# Patient Record
Sex: MALE | Race: WHITE | NOT HISPANIC OR LATINO
[De-identification: names, ages, dates, MRNs, and addresses within clinical notes are randomized per-mention and may not be internally consistent; named-entity substitution may affect disease eponyms.]

---

## 2017-01-26 ENCOUNTER — APPOINTMENT (OUTPATIENT)
Dept: UROLOGY | Facility: CLINIC | Age: 42
End: 2017-01-26

## 2017-01-26 DIAGNOSIS — R79.89 OTHER SPECIFIED ABNORMAL FINDINGS OF BLOOD CHEMISTRY: ICD-10-CM

## 2017-01-26 DIAGNOSIS — R35.0 FREQUENCY OF MICTURITION: ICD-10-CM

## 2017-03-20 ENCOUNTER — FORM ENCOUNTER (OUTPATIENT)
Age: 42
End: 2017-03-20

## 2017-03-21 ENCOUNTER — OUTPATIENT (OUTPATIENT)
Dept: OUTPATIENT SERVICES | Facility: HOSPITAL | Age: 42
LOS: 1 days | End: 2017-03-21
Payer: COMMERCIAL

## 2017-03-21 ENCOUNTER — APPOINTMENT (OUTPATIENT)
Dept: UROLOGY | Facility: CLINIC | Age: 42
End: 2017-03-21

## 2017-03-21 ENCOUNTER — APPOINTMENT (OUTPATIENT)
Dept: CT IMAGING | Facility: CLINIC | Age: 42
End: 2017-03-21

## 2017-03-21 DIAGNOSIS — N20.0 CALCULUS OF KIDNEY: ICD-10-CM

## 2017-03-21 DIAGNOSIS — Z00.8 ENCOUNTER FOR OTHER GENERAL EXAMINATION: ICD-10-CM

## 2017-03-21 DIAGNOSIS — Z98.89 OTHER SPECIFIED POSTPROCEDURAL STATES: Chronic | ICD-10-CM

## 2017-03-21 DIAGNOSIS — R10.31 RIGHT LOWER QUADRANT PAIN: ICD-10-CM

## 2017-03-21 LAB
BILIRUB UR QL STRIP: NEGATIVE
CLARITY UR: CLEAR
COLLECTION METHOD: NORMAL
GLUCOSE UR-MCNC: NEGATIVE
HCG UR QL: 0.2 EU/DL
HGB UR QL STRIP.AUTO: NEGATIVE
KETONES UR-MCNC: NEGATIVE
LEUKOCYTE ESTERASE UR QL STRIP: NEGATIVE
NITRITE UR QL STRIP: NEGATIVE
PH UR STRIP: 5.5
PROT UR STRIP-MCNC: NEGATIVE
SP GR UR STRIP: 1

## 2017-03-21 PROCEDURE — 74176 CT ABD & PELVIS W/O CONTRAST: CPT

## 2017-06-05 ENCOUNTER — RECORD ABSTRACTING (OUTPATIENT)
Age: 42
End: 2017-06-05

## 2017-06-08 ENCOUNTER — APPOINTMENT (OUTPATIENT)
Dept: INTERNAL MEDICINE | Facility: CLINIC | Age: 42
End: 2017-06-08

## 2017-06-08 VITALS
SYSTOLIC BLOOD PRESSURE: 140 MMHG | HEIGHT: 69 IN | BODY MASS INDEX: 35.1 KG/M2 | TEMPERATURE: 98.1 F | OXYGEN SATURATION: 97 % | WEIGHT: 237 LBS | RESPIRATION RATE: 18 BRPM | HEART RATE: 82 BPM | DIASTOLIC BLOOD PRESSURE: 82 MMHG

## 2017-06-08 RX ORDER — ALBUTEROL SULFATE 90 UG/1
108 (90 BASE) AEROSOL, METERED RESPIRATORY (INHALATION)
Qty: 1 | Refills: 5 | Status: ACTIVE | COMMUNITY
Start: 2017-06-08 | End: 1900-01-01

## 2017-07-03 ENCOUNTER — APPOINTMENT (OUTPATIENT)
Dept: DERMATOLOGY | Facility: CLINIC | Age: 42
End: 2017-07-03

## 2017-07-28 ENCOUNTER — APPOINTMENT (OUTPATIENT)
Dept: DERMATOLOGY | Facility: CLINIC | Age: 42
End: 2017-07-28
Payer: MEDICAID

## 2017-07-28 VITALS — WEIGHT: 240 LBS | HEIGHT: 69 IN | BODY MASS INDEX: 35.55 KG/M2

## 2017-07-28 DIAGNOSIS — Z85.828 PERSONAL HISTORY OF OTHER MALIGNANT NEOPLASM OF SKIN: ICD-10-CM

## 2017-07-28 DIAGNOSIS — Z80.8 FAMILY HISTORY OF MALIGNANT NEOPLASM OF OTHER ORGANS OR SYSTEMS: ICD-10-CM

## 2017-07-28 DIAGNOSIS — L40.9 PSORIASIS, UNSPECIFIED: ICD-10-CM

## 2017-07-28 PROCEDURE — 99203 OFFICE O/P NEW LOW 30 MIN: CPT

## 2017-07-28 RX ORDER — CLOTRIMAZOLE AND BETAMETHASONE DIPROPIONATE 10; .5 MG/G; MG/G
1-0.05 CREAM TOPICAL
Qty: 30 | Refills: 0 | Status: COMPLETED | COMMUNITY
Start: 2017-02-13

## 2017-07-28 RX ORDER — FLUTICASONE PROPIONATE 50 UG/1
50 SPRAY, METERED NASAL
Qty: 16 | Refills: 0 | Status: COMPLETED | COMMUNITY
Start: 2017-06-23

## 2017-07-28 RX ORDER — CLOBETASOL PROPIONATE 0.5 MG/ML
0.05 SOLUTION TOPICAL
Qty: 1 | Refills: 2 | Status: DISCONTINUED | COMMUNITY
Start: 2017-07-28 | End: 2017-07-28

## 2017-07-28 RX ORDER — FLUOCINONIDE 0.5 MG/G
0.05 CREAM TOPICAL
Qty: 30 | Refills: 0 | Status: COMPLETED | COMMUNITY
Start: 2017-02-13

## 2017-07-28 RX ORDER — CLOBETASOL PROPIONATE 0.5 MG/G
0.05 CREAM TOPICAL
Qty: 60 | Refills: 0 | Status: ACTIVE | COMMUNITY
Start: 2017-06-23

## 2017-07-28 RX ORDER — AZELASTINE HYDROCHLORIDE 137 UG/1
0.1 SPRAY, METERED NASAL
Qty: 30 | Refills: 0 | Status: ACTIVE | COMMUNITY
Start: 2017-06-23

## 2017-07-28 RX ORDER — CLOBETASOL PROPIONATE 0.5 MG/G
0.05 OINTMENT TOPICAL
Qty: 1 | Refills: 2 | Status: DISCONTINUED | COMMUNITY
Start: 2017-07-28 | End: 2017-07-28

## 2017-07-28 RX ORDER — NYSTATIN 100000 [USP'U]/ML
100000 SUSPENSION ORAL
Qty: 200 | Refills: 0 | Status: COMPLETED | COMMUNITY
Start: 2017-06-23

## 2017-07-28 RX ORDER — VALSARTAN 320 MG/1
320 TABLET, COATED ORAL
Qty: 30 | Refills: 0 | Status: COMPLETED | COMMUNITY
Start: 2017-01-23

## 2017-07-28 RX ORDER — PANTOPRAZOLE 40 MG/1
40 TABLET, DELAYED RELEASE ORAL
Qty: 30 | Refills: 0 | Status: DISCONTINUED | COMMUNITY
Start: 2017-06-23

## 2017-07-28 RX ORDER — BETAMETHASONE DIPROPIONATE 0.5 MG/ML
0.05 LOTION, AUGMENTED TOPICAL
Qty: 60 | Refills: 3 | Status: ACTIVE | COMMUNITY
Start: 2017-07-28 | End: 1900-01-01

## 2017-07-28 RX ORDER — CEFUROXIME AXETIL 250 MG/1
250 TABLET ORAL
Qty: 14 | Refills: 0 | Status: COMPLETED | COMMUNITY
Start: 2017-06-23

## 2017-07-28 RX ORDER — CIPROFLOXACIN HYDROCHLORIDE 250 MG/1
250 TABLET, FILM COATED ORAL
Qty: 14 | Refills: 0 | Status: COMPLETED | COMMUNITY
Start: 2017-04-25

## 2017-07-28 RX ORDER — AZITHROMYCIN 250 MG/1
250 TABLET, FILM COATED ORAL
Qty: 6 | Refills: 0 | Status: DISCONTINUED | COMMUNITY
Start: 2017-02-13

## 2017-07-28 RX ORDER — GUAIFENESIN AND CODEINE PHOSPHATE 100; 10 MG/5ML; MG/5ML
100-10 SYRUP ORAL
Qty: 240 | Refills: 0 | Status: COMPLETED | COMMUNITY
Start: 2017-05-17

## 2017-07-28 RX ORDER — BETAMETHASONE DIPROPIONATE 0.5 MG/G
0.05 OINTMENT, AUGMENTED TOPICAL
Qty: 1 | Refills: 2 | Status: ACTIVE | COMMUNITY
Start: 2017-07-28 | End: 1900-01-01

## 2017-08-28 ENCOUNTER — APPOINTMENT (OUTPATIENT)
Dept: DERMATOLOGY | Facility: CLINIC | Age: 42
End: 2017-08-28

## 2017-09-11 ENCOUNTER — APPOINTMENT (OUTPATIENT)
Dept: INTERNAL MEDICINE | Facility: CLINIC | Age: 42
End: 2017-09-11

## 2017-10-12 ENCOUNTER — APPOINTMENT (OUTPATIENT)
Dept: SLEEP CENTER | Facility: CLINIC | Age: 42
End: 2017-10-12

## 2017-12-11 ENCOUNTER — APPOINTMENT (OUTPATIENT)
Dept: SLEEP CENTER | Facility: CLINIC | Age: 42
End: 2017-12-11

## 2018-01-28 ENCOUNTER — TRANSCRIPTION ENCOUNTER (OUTPATIENT)
Age: 43
End: 2018-01-28

## 2018-02-20 ENCOUNTER — APPOINTMENT (OUTPATIENT)
Dept: SLEEP CENTER | Facility: CLINIC | Age: 43
End: 2018-02-20
Payer: MEDICAID

## 2018-02-20 ENCOUNTER — OUTPATIENT (OUTPATIENT)
Dept: OUTPATIENT SERVICES | Facility: HOSPITAL | Age: 43
LOS: 1 days | End: 2018-02-20
Payer: MEDICAID

## 2018-02-20 DIAGNOSIS — Z98.89 OTHER SPECIFIED POSTPROCEDURAL STATES: Chronic | ICD-10-CM

## 2018-02-20 PROCEDURE — G0399: CPT

## 2018-02-20 PROCEDURE — G0399: CPT | Mod: 26

## 2018-02-22 ENCOUNTER — RESULT REVIEW (OUTPATIENT)
Age: 43
End: 2018-02-22

## 2018-02-22 DIAGNOSIS — G47.33 OBSTRUCTIVE SLEEP APNEA (ADULT) (PEDIATRIC): ICD-10-CM

## 2018-02-26 ENCOUNTER — NON-APPOINTMENT (OUTPATIENT)
Age: 43
End: 2018-02-26

## 2018-02-26 ENCOUNTER — APPOINTMENT (OUTPATIENT)
Dept: INTERNAL MEDICINE | Facility: CLINIC | Age: 43
End: 2018-02-26
Payer: MEDICAID

## 2018-02-26 VITALS
WEIGHT: 236 LBS | HEIGHT: 69 IN | RESPIRATION RATE: 16 BRPM | HEART RATE: 96 BPM | OXYGEN SATURATION: 96 % | DIASTOLIC BLOOD PRESSURE: 84 MMHG | SYSTOLIC BLOOD PRESSURE: 122 MMHG | BODY MASS INDEX: 34.96 KG/M2 | TEMPERATURE: 98.2 F

## 2018-02-26 DIAGNOSIS — M54.5 LOW BACK PAIN: ICD-10-CM

## 2018-02-26 PROCEDURE — 94060 EVALUATION OF WHEEZING: CPT

## 2018-02-26 PROCEDURE — 99214 OFFICE O/P EST MOD 30 MIN: CPT | Mod: 25

## 2018-03-24 ENCOUNTER — EMERGENCY (EMERGENCY)
Facility: HOSPITAL | Age: 43
LOS: 0 days | Discharge: HOME | End: 2018-03-24
Attending: EMERGENCY MEDICINE | Admitting: EMERGENCY MEDICINE
Payer: MEDICAID

## 2018-03-24 VITALS
HEART RATE: 83 BPM | RESPIRATION RATE: 17 BRPM | DIASTOLIC BLOOD PRESSURE: 88 MMHG | OXYGEN SATURATION: 100 % | TEMPERATURE: 98 F | SYSTOLIC BLOOD PRESSURE: 126 MMHG

## 2018-03-24 VITALS — HEIGHT: 69 IN | WEIGHT: 235.01 LBS

## 2018-03-24 DIAGNOSIS — R10.32 LEFT LOWER QUADRANT PAIN: ICD-10-CM

## 2018-03-24 DIAGNOSIS — N50.89 OTHER SPECIFIED DISORDERS OF THE MALE GENITAL ORGANS: ICD-10-CM

## 2018-03-24 DIAGNOSIS — Z98.89 OTHER SPECIFIED POSTPROCEDURAL STATES: Chronic | ICD-10-CM

## 2018-03-24 LAB
ALBUMIN SERPL ELPH-MCNC: 3.7 G/DL — SIGNIFICANT CHANGE UP (ref 3.3–5)
ALP SERPL-CCNC: 86 U/L — SIGNIFICANT CHANGE UP (ref 40–120)
ALT FLD-CCNC: 42 U/L — SIGNIFICANT CHANGE UP (ref 12–78)
ANION GAP SERPL CALC-SCNC: 10 MMOL/L — SIGNIFICANT CHANGE UP (ref 5–17)
APPEARANCE UR: CLEAR — SIGNIFICANT CHANGE UP
APTT BLD: 32.5 SEC — SIGNIFICANT CHANGE UP (ref 27.5–37.4)
AST SERPL-CCNC: 23 U/L — SIGNIFICANT CHANGE UP (ref 15–37)
BASOPHILS # BLD AUTO: 0.06 K/UL — SIGNIFICANT CHANGE UP (ref 0–0.2)
BASOPHILS NFR BLD AUTO: 1.2 % — SIGNIFICANT CHANGE UP (ref 0–2)
BILIRUB SERPL-MCNC: 0.5 MG/DL — SIGNIFICANT CHANGE UP (ref 0.2–1.2)
BILIRUB UR-MCNC: NEGATIVE — SIGNIFICANT CHANGE UP
BUN SERPL-MCNC: 16 MG/DL — SIGNIFICANT CHANGE UP (ref 7–23)
CALCIUM SERPL-MCNC: 8.7 MG/DL — SIGNIFICANT CHANGE UP (ref 8.5–10.1)
CHLORIDE SERPL-SCNC: 108 MMOL/L — SIGNIFICANT CHANGE UP (ref 96–108)
CO2 SERPL-SCNC: 24 MMOL/L — SIGNIFICANT CHANGE UP (ref 22–31)
COLOR SPEC: YELLOW — SIGNIFICANT CHANGE UP
CREAT SERPL-MCNC: 1.04 MG/DL — SIGNIFICANT CHANGE UP (ref 0.5–1.3)
D DIMER BLD IA.RAPID-MCNC: 186 NG/ML DDU — SIGNIFICANT CHANGE UP
DIFF PNL FLD: NEGATIVE — SIGNIFICANT CHANGE UP
EOSINOPHIL # BLD AUTO: 0.19 K/UL — SIGNIFICANT CHANGE UP (ref 0–0.5)
EOSINOPHIL NFR BLD AUTO: 3.7 % — SIGNIFICANT CHANGE UP (ref 0–6)
GLUCOSE SERPL-MCNC: 93 MG/DL — SIGNIFICANT CHANGE UP (ref 70–99)
GLUCOSE UR QL: NEGATIVE MG/DL — SIGNIFICANT CHANGE UP
HCT VFR BLD CALC: 44.7 % — SIGNIFICANT CHANGE UP (ref 39–50)
HGB BLD-MCNC: 14.7 G/DL — SIGNIFICANT CHANGE UP (ref 13–17)
IMM GRANULOCYTES NFR BLD AUTO: 0.2 % — SIGNIFICANT CHANGE UP (ref 0–1.5)
INR BLD: 0.99 RATIO — SIGNIFICANT CHANGE UP (ref 0.88–1.16)
KETONES UR-MCNC: NEGATIVE — SIGNIFICANT CHANGE UP
LEUKOCYTE ESTERASE UR-ACNC: NEGATIVE — SIGNIFICANT CHANGE UP
LYMPHOCYTES # BLD AUTO: 1.11 K/UL — SIGNIFICANT CHANGE UP (ref 1–3.3)
LYMPHOCYTES # BLD AUTO: 21.8 % — SIGNIFICANT CHANGE UP (ref 13–44)
MCHC RBC-ENTMCNC: 28.9 PG — SIGNIFICANT CHANGE UP (ref 27–34)
MCHC RBC-ENTMCNC: 32.9 GM/DL — SIGNIFICANT CHANGE UP (ref 32–36)
MCV RBC AUTO: 87.8 FL — SIGNIFICANT CHANGE UP (ref 80–100)
MONOCYTES # BLD AUTO: 0.54 K/UL — SIGNIFICANT CHANGE UP (ref 0–0.9)
MONOCYTES NFR BLD AUTO: 10.6 % — SIGNIFICANT CHANGE UP (ref 2–14)
NEUTROPHILS # BLD AUTO: 3.19 K/UL — SIGNIFICANT CHANGE UP (ref 1.8–7.4)
NEUTROPHILS NFR BLD AUTO: 62.5 % — SIGNIFICANT CHANGE UP (ref 43–77)
NITRITE UR-MCNC: NEGATIVE — SIGNIFICANT CHANGE UP
NRBC # BLD: 0 /100 WBCS — SIGNIFICANT CHANGE UP (ref 0–0)
PH UR: 6.5 — SIGNIFICANT CHANGE UP (ref 5–8)
PLATELET # BLD AUTO: 233 K/UL — SIGNIFICANT CHANGE UP (ref 150–400)
POTASSIUM SERPL-MCNC: 4.2 MMOL/L — SIGNIFICANT CHANGE UP (ref 3.5–5.3)
POTASSIUM SERPL-SCNC: 4.2 MMOL/L — SIGNIFICANT CHANGE UP (ref 3.5–5.3)
PROT SERPL-MCNC: 7.4 GM/DL — SIGNIFICANT CHANGE UP (ref 6–8.3)
PROT UR-MCNC: NEGATIVE MG/DL — SIGNIFICANT CHANGE UP
PROTHROM AB SERPL-ACNC: 10.7 SEC — SIGNIFICANT CHANGE UP (ref 9.8–12.7)
RBC # BLD: 5.09 M/UL — SIGNIFICANT CHANGE UP (ref 4.2–5.8)
RBC # FLD: 13.2 % — SIGNIFICANT CHANGE UP (ref 10.3–14.5)
SODIUM SERPL-SCNC: 142 MMOL/L — SIGNIFICANT CHANGE UP (ref 135–145)
SP GR SPEC: 1.01 — SIGNIFICANT CHANGE UP (ref 1.01–1.02)
TROPONIN I SERPL-MCNC: <0.015 NG/ML — SIGNIFICANT CHANGE UP (ref 0.01–0.04)
UROBILINOGEN FLD QL: NEGATIVE MG/DL — SIGNIFICANT CHANGE UP
WBC # BLD: 5.1 K/UL — SIGNIFICANT CHANGE UP (ref 3.8–10.5)
WBC # FLD AUTO: 5.1 K/UL — SIGNIFICANT CHANGE UP (ref 3.8–10.5)

## 2018-03-24 PROCEDURE — 93010 ELECTROCARDIOGRAM REPORT: CPT

## 2018-03-24 PROCEDURE — 76870 US EXAM SCROTUM: CPT | Mod: 26

## 2018-03-24 PROCEDURE — 74176 CT ABD & PELVIS W/O CONTRAST: CPT | Mod: 26

## 2018-03-24 PROCEDURE — 99284 EMERGENCY DEPT VISIT MOD MDM: CPT

## 2018-03-24 PROCEDURE — 71046 X-RAY EXAM CHEST 2 VIEWS: CPT | Mod: 26

## 2018-03-24 RX ORDER — MORPHINE SULFATE 50 MG/1
4 CAPSULE, EXTENDED RELEASE ORAL ONCE
Qty: 0 | Refills: 0 | Status: DISCONTINUED | OUTPATIENT
Start: 2018-03-24 | End: 2018-03-24

## 2018-03-24 RX ORDER — KETOROLAC TROMETHAMINE 30 MG/ML
30 SYRINGE (ML) INJECTION ONCE
Qty: 0 | Refills: 0 | Status: DISCONTINUED | OUTPATIENT
Start: 2018-03-24 | End: 2018-03-24

## 2018-03-24 RX ORDER — SODIUM CHLORIDE 9 MG/ML
1000 INJECTION INTRAMUSCULAR; INTRAVENOUS; SUBCUTANEOUS ONCE
Qty: 0 | Refills: 0 | Status: COMPLETED | OUTPATIENT
Start: 2018-03-24 | End: 2018-03-24

## 2018-03-24 RX ADMIN — MORPHINE SULFATE 4 MILLIGRAM(S): 50 CAPSULE, EXTENDED RELEASE ORAL at 20:49

## 2018-03-24 RX ADMIN — SODIUM CHLORIDE 1000 MILLILITER(S): 9 INJECTION INTRAMUSCULAR; INTRAVENOUS; SUBCUTANEOUS at 19:04

## 2018-03-24 RX ADMIN — Medication 30 MILLIGRAM(S): at 22:41

## 2018-03-24 RX ADMIN — Medication 30 MILLIGRAM(S): at 19:00

## 2018-03-24 RX ADMIN — MORPHINE SULFATE 4 MILLIGRAM(S): 50 CAPSULE, EXTENDED RELEASE ORAL at 21:10

## 2018-03-24 NOTE — ED ADULT TRIAGE NOTE - CHIEF COMPLAINT QUOTE
pt c/o abdominal pain radiating around to the back since yesterday. chest pain/tightness starting about 30min ago. Pt has h/o kidney stones and states it feels similar to that. denies hematuria.

## 2018-03-24 NOTE — ED STATDOCS - PHYSICAL EXAMINATION
GEN: AOX3, NAD. HEENT: Throat clear. Head NC/AT. NECK: Supple, No JVD. FROM. C-spine non-tender. CV:S1S2, RRR, LUNGS: CTA b/l, no w/r/r. ABD: Soft, NT/ND, no rebound, no guarding. No CVAT. EXT: No e/c/c. 2+ distal pulses. SKIN: No rashes. NEURO: No focal deficits. CN II-XII intact. FROM. 5/5 motor and sensory. : No penile discharge or rashes. Mild tenderness left testicle. SIERRA Pham

## 2018-03-24 NOTE — ED STATDOCS - PMH
Diverticulosis    GERD (gastroesophageal reflux disease)    Hiatal hernia    HLD (hyperlipidemia)    HTN (hypertension)    Peptic ulcer disease    Psoriasis

## 2018-03-24 NOTE — ED STATDOCS - PROGRESS NOTE DETAILS
patient reexamined by Dr. Mohamud. Patient is feeling much better, tests/labs reviewed. case discussed with attending. OK to dc home. SIERRA Pham

## 2018-03-24 NOTE — ED STATDOCS - OBJECTIVE STATEMENT
42 y/o M w/ pmhx of HTN, renal calculi, HLD, presents to ED c/o left testicular pain starting 3 days ago, abdominal pain and back pain. Pt states pain feels similar to that of previous kidney stone. Reports chest pain x1 hour ago. Denies fever, chills, hematuria, or any other acute complaints. Urologist Dr. Pinto. Currently on Losartan.

## 2018-03-26 ENCOUNTER — APPOINTMENT (OUTPATIENT)
Dept: UROLOGY | Facility: CLINIC | Age: 43
End: 2018-03-26
Payer: MEDICAID

## 2018-03-26 DIAGNOSIS — R35.1 NOCTURIA: ICD-10-CM

## 2018-03-26 LAB
APPEARANCE: CLEAR
BACTERIA UR CULT: NORMAL
BACTERIA: NEGATIVE
BILIRUBIN URINE: NEGATIVE
BLOOD URINE: NEGATIVE
COLOR: YELLOW
GLUCOSE QUALITATIVE U: NEGATIVE MG/DL
HYALINE CASTS: 0 /LPF
KETONES URINE: NEGATIVE
LEUKOCYTE ESTERASE URINE: NEGATIVE
MICROSCOPIC-UA: NORMAL
NITRITE URINE: NEGATIVE
PH URINE: 5
PROTEIN URINE: NEGATIVE MG/DL
RED BLOOD CELLS URINE: 4 /HPF
SPECIFIC GRAVITY URINE: 1.02
SQUAMOUS EPITHELIAL CELLS: 0 /HPF
UROBILINOGEN URINE: NEGATIVE MG/DL
WHITE BLOOD CELLS URINE: 0 /HPF

## 2018-03-26 PROCEDURE — 99214 OFFICE O/P EST MOD 30 MIN: CPT

## 2018-03-26 RX ORDER — TAMSULOSIN HYDROCHLORIDE 0.4 MG/1
0.4 CAPSULE ORAL
Qty: 90 | Refills: 3 | Status: ACTIVE | COMMUNITY
Start: 2018-03-26 | End: 1900-01-01

## 2018-05-02 ENCOUNTER — APPOINTMENT (OUTPATIENT)
Dept: UROLOGY | Facility: CLINIC | Age: 43
End: 2018-05-02

## 2018-08-28 ENCOUNTER — NON-APPOINTMENT (OUTPATIENT)
Age: 43
End: 2018-08-28

## 2018-08-28 ENCOUNTER — APPOINTMENT (OUTPATIENT)
Dept: INTERNAL MEDICINE | Facility: CLINIC | Age: 43
End: 2018-08-28
Payer: MEDICAID

## 2018-08-28 VITALS
HEART RATE: 96 BPM | WEIGHT: 243 LBS | HEIGHT: 69 IN | DIASTOLIC BLOOD PRESSURE: 84 MMHG | TEMPERATURE: 98.2 F | RESPIRATION RATE: 18 BRPM | OXYGEN SATURATION: 96 % | BODY MASS INDEX: 35.99 KG/M2 | SYSTOLIC BLOOD PRESSURE: 132 MMHG

## 2018-08-28 DIAGNOSIS — Z87.898 PERSONAL HISTORY OF OTHER SPECIFIED CONDITIONS: ICD-10-CM

## 2018-08-28 DIAGNOSIS — G47.33 OBSTRUCTIVE SLEEP APNEA (ADULT) (PEDIATRIC): ICD-10-CM

## 2018-08-28 PROCEDURE — 99214 OFFICE O/P EST MOD 30 MIN: CPT | Mod: 25

## 2018-08-28 PROCEDURE — 94060 EVALUATION OF WHEEZING: CPT

## 2019-04-15 ENCOUNTER — APPOINTMENT (OUTPATIENT)
Dept: INTERNAL MEDICINE | Facility: CLINIC | Age: 44
End: 2019-04-15

## 2020-02-10 ENCOUNTER — EMERGENCY (EMERGENCY)
Facility: HOSPITAL | Age: 45
LOS: 0 days | Discharge: HOME | End: 2020-02-11
Attending: STUDENT IN AN ORGANIZED HEALTH CARE EDUCATION/TRAINING PROGRAM
Payer: COMMERCIAL

## 2020-02-10 VITALS
HEIGHT: 69 IN | DIASTOLIC BLOOD PRESSURE: 94 MMHG | RESPIRATION RATE: 22 BRPM | SYSTOLIC BLOOD PRESSURE: 176 MMHG | TEMPERATURE: 98 F | OXYGEN SATURATION: 100 % | HEART RATE: 102 BPM | WEIGHT: 199.96 LBS

## 2020-02-10 DIAGNOSIS — K21.9 GASTRO-ESOPHAGEAL REFLUX DISEASE WITHOUT ESOPHAGITIS: ICD-10-CM
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
## 2020-02-10 DIAGNOSIS — T50.995A ADVERSE EFFECT OF OTHER DRUGS, MEDICAMENTS AND BIOLOGICAL SUBSTANCES, INITIAL ENCOUNTER: ICD-10-CM

## 2020-02-10 DIAGNOSIS — R06.00 DYSPNEA, UNSPECIFIED: ICD-10-CM

## 2020-02-10 DIAGNOSIS — E78.5 HYPERLIPIDEMIA, UNSPECIFIED: ICD-10-CM
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
## 2020-02-10 DIAGNOSIS — Z98.89 OTHER SPECIFIED POSTPROCEDURAL STATES: Chronic | ICD-10-CM

## 2020-02-10 DIAGNOSIS — I10 ESSENTIAL (PRIMARY) HYPERTENSION: ICD-10-CM

## 2020-02-10 PROCEDURE — 96374 THER/PROPH/DIAG INJ IV PUSH: CPT

## 2020-02-10 PROCEDURE — 96372 THER/PROPH/DIAG INJ SC/IM: CPT | Mod: XU

## 2020-02-10 PROCEDURE — 99284 EMERGENCY DEPT VISIT MOD MDM: CPT | Mod: 25

## 2020-02-10 PROCEDURE — 93005 ELECTROCARDIOGRAM TRACING: CPT

## 2020-02-10 PROCEDURE — 99284 EMERGENCY DEPT VISIT MOD MDM: CPT

## 2020-02-10 PROCEDURE — 96375 TX/PRO/DX INJ NEW DRUG ADDON: CPT

## 2020-02-10 NOTE — ED ADULT TRIAGE NOTE - CHIEF COMPLAINT QUOTE
started on Ramelteon 3 days ago for insomnia. diff swallowing started 11pm tonight. took 25mg benadryl and .5mg xanax prior to arrival. Pt anxious in Triage. Speech clear. no drooling noted.

## 2020-02-11 VITALS
HEART RATE: 93 BPM | TEMPERATURE: 98 F | OXYGEN SATURATION: 95 % | SYSTOLIC BLOOD PRESSURE: 110 MMHG | RESPIRATION RATE: 13 BRPM | DIASTOLIC BLOOD PRESSURE: 71 MMHG

## 2020-02-11 PROCEDURE — 93010 ELECTROCARDIOGRAM REPORT: CPT

## 2020-02-11 RX ORDER — EPINEPHRINE 0.3 MG/.3ML
0.3 INJECTION INTRAMUSCULAR; SUBCUTANEOUS
Qty: 1 | Refills: 0
Start: 2020-02-11 | End: 2020-02-11

## 2020-02-11 RX ORDER — EPINEPHRINE 0.3 MG/.3ML
0.3 INJECTION INTRAMUSCULAR; SUBCUTANEOUS ONCE
Refills: 0 | Status: COMPLETED | OUTPATIENT
Start: 2020-02-11 | End: 2020-02-11

## 2020-02-11 RX ORDER — SODIUM CHLORIDE 9 MG/ML
1000 INJECTION INTRAMUSCULAR; INTRAVENOUS; SUBCUTANEOUS ONCE
Refills: 0 | Status: COMPLETED | OUTPATIENT
Start: 2020-02-11 | End: 2020-02-11

## 2020-02-11 RX ORDER — FAMOTIDINE 10 MG/ML
20 INJECTION INTRAVENOUS ONCE
Refills: 0 | Status: COMPLETED | OUTPATIENT
Start: 2020-02-11 | End: 2020-02-11

## 2020-02-11 RX ADMIN — SODIUM CHLORIDE 1000 MILLILITER(S): 9 INJECTION INTRAMUSCULAR; INTRAVENOUS; SUBCUTANEOUS at 00:11

## 2020-02-11 RX ADMIN — Medication 125 MILLIGRAM(S): at 00:11

## 2020-02-11 RX ADMIN — EPINEPHRINE 0.3 MILLIGRAM(S): 0.3 INJECTION INTRAMUSCULAR; SUBCUTANEOUS at 00:09

## 2020-02-11 RX ADMIN — FAMOTIDINE 20 MILLIGRAM(S): 10 INJECTION INTRAVENOUS at 00:11

## 2020-02-11 NOTE — ED PROVIDER NOTE - OBJECTIVE STATEMENT
Patient is a 46 yo male with report of throat closing x prior to arrival after taking a new sleeping medication- ramelteon 8mg; patient felt "funny" yesterday after taking it but this evening; took medication; attempted to go to sleep and felt dryness in his throat and difficulty swallowing; patient not drooling; handling secretions; no sob contrary to triage; no chest pain; no rash; no other new medications; no sick contacts.

## 2020-02-11 NOTE — ED PROVIDER NOTE - PATIENT PORTAL LINK FT
You can access the FollowMyHealth Patient Portal offered by [facility] by registering at the following website: [email or web address]. By joining [facility]’s FollowMyHealth portal, you will also be able to view your health information using other applications (apps) compatible with our system.

## 2020-02-11 NOTE — ED PROVIDER NOTE - PROGRESS NOTE DETAILS
Armando TROY: Patient feeling better at this time; tolerating secretions; not drooling; will continue to re-eval Armando DO: patient resting comfortably; po challenge completed; no drooling; tolerating secretions; feels better at this time; observed in ed with no acute events; cleared for d/c home at this time; strict return precautions given; advised to stop sleeping medication. f/u with pmd in 1-2 days without fail

## 2020-02-11 NOTE — ED ADULT NURSE NOTE - NSIMPLEMENTINTERV_GEN_ALL_ED
Implemented All Universal Safety Interventions:  [place] to call system. Call bell, personal items and telephone within reach. Instruct patient to call for assistance. Room bathroom lighting operational. Non-slip footwear when patient is off stretcher. Physically safe environment: no spills, clutter or unnecessary equipment. Stretcher in lowest position, wheels locked, appropriate side rails in place.

## 2020-02-11 NOTE — ED PROVIDER NOTE - CLINICAL SUMMARY MEDICAL DECISION MAKING FREE TEXT BOX
46 yo male with sensation of throat closing af 46 yo male with sensation of throat closing after taking ramelteon; epi, steroids, pepcid, patient took benadryl prior to arrival; re-eval

## 2020-12-03 ENCOUNTER — NON-APPOINTMENT (OUTPATIENT)
Age: 45
End: 2020-12-03

## 2020-12-16 ENCOUNTER — APPOINTMENT (OUTPATIENT)
Dept: ORTHOPEDIC SURGERY | Facility: CLINIC | Age: 45
End: 2020-12-16
Payer: OTHER MISCELLANEOUS

## 2020-12-16 VITALS
DIASTOLIC BLOOD PRESSURE: 86 MMHG | TEMPERATURE: 99.1 F | SYSTOLIC BLOOD PRESSURE: 132 MMHG | WEIGHT: 245 LBS | HEART RATE: 103 BPM | HEIGHT: 70 IN | BODY MASS INDEX: 35.07 KG/M2

## 2020-12-16 DIAGNOSIS — S93.401A SPRAIN OF UNSPECIFIED LIGAMENT OF RIGHT ANKLE, INITIAL ENCOUNTER: ICD-10-CM

## 2020-12-16 DIAGNOSIS — S90.01XA CONTUSION OF RIGHT ANKLE, INITIAL ENCOUNTER: ICD-10-CM

## 2020-12-16 PROCEDURE — 99203 OFFICE O/P NEW LOW 30 MIN: CPT

## 2020-12-16 PROCEDURE — 99072 ADDL SUPL MATRL&STAF TM PHE: CPT

## 2020-12-18 PROBLEM — S90.01XA CONTUSION OF RIGHT ANKLE, INITIAL ENCOUNTER: Status: ACTIVE | Noted: 2020-12-18

## 2020-12-19 NOTE — DISCUSSION/SUMMARY
[de-identified] : At this time, I have recommended a CAM boot, weightbearing as tolerated, for the contusion of the right ankle and medial and lateral ankle sprain.  He will be reassessed in three to four weeks.  \par \par THIS IS A FORMAL REQUEST FOR AUTHORIZATION FOR A CAM BOOT FOR THE RIGHT ANKLE.  \par \par The Workers' Compensation guidelines have been followed.

## 2020-12-19 NOTE — ADDENDUM
[FreeTextEntry1] : This note was written by Miladis Richard on 12/18/2020 acting as scribe for Joseph Thompson III, MD

## 2020-12-19 NOTE — HISTORY OF PRESENT ILLNESS
[de-identified] : The patient comes in today with complaints of pain to his right ankle.  He notes he was backing up and hit a pallet at work.  He had a hyperdorsiflexion type injury to his right ankle.  He is having pain in both the medial and lateral side.  He had x-rays taken elsewhere.  He comes here for evaluation.  The patient states the onset/injury occurred on 20.  This injury is work related, as noted above.  The patient describes the pain as sharp.  The patient states lying down makes the pain better while standing makes the pain worse.\par \par Pain level includes a current pain level of 6/10.\par \par Ailment Interference:  \par Daily Livin/10\par Normal Work:  5/10\par Sleep:  7/10\par Enjoyment of Life:  5/10\par Climbing Stairs:   8/10\par Sports:  8/10\par Extra-Curricular Activities:  8/10 [] : No

## 2020-12-19 NOTE — REASON FOR VISIT
[Initial Visit] : an initial visit for [FreeTextEntry2] : his right ankle.  This visit is related to a Workers' Compensation injury

## 2020-12-19 NOTE — PHYSICAL EXAM
[de-identified] : Left Ankle: \par Ankle: Range of Motion in Degrees:\par 	                                Claimant:	                Normal:	\par Dorsiflexion (Active)	40-degrees	40-degrees	\par Dorsiflexion (Passive)	40-degrees	40-degrees	\par Plantar (Active)	                40-degrees	40-degrees	\par Plantar (Passive)	                40-degrees	40-degrees	\par Inversion (Active)	                30-degrees	30-degrees	\par Inversion (Passive)	                30-degrees	30-degrees	\par Eversion  (Active)	                20-degrees	20-degrees	\par Eversion (Passive) 	                20-degrees	20-degrees	\par \par No weakness in dorsiflexion, plantar flexion, inversion or eversion.  Normal sensation.  No tenderness over the medial or lateral ligaments.  No tenderness over the DLES.  No evidence of instability.  Negative anterior drawer sign.  No medial or lateral bony tenderness.  No proximal fibular tenderness.  No anterior, posterior, medial or lateral tendon tenderness.  No intra-articular swelling.  No extra-articular swelling, edema or tenderness.  No tenderness over the plantar aspect of the os calcis.  2+ DP and PT pulses. Skin is intact.  No rashes, scars or lesions.  \par  \par Right Ankle: \par Ankle: Range of Motion in Degrees:\par 	                                Claimant:	                Normal:	\par Dorsiflexion (Active)	40-degrees	40-degrees	\par Dorsiflexion (Passive)	40-degrees	40-degrees	\par Plantar (Active)	                40-degrees	40-degrees	\par Plantar (Passive)	                40-degrees	40-degrees	\par Inversion (Active)	                30-degrees	30-degrees	\par Inversion (Passive)	                30-degrees	30-degrees	\par Eversion  (Active)	                20-degrees	20-degrees	\par Eversion (Passive)  	20-degrees	20-degrees	\par \par No weakness in dorsiflexion, plantar flexion, inversion or eversion.  Normal sensation.  `Diffuse swelling and tenderness over the medial aspect.  Positive diffuse swelling laterally.  Tender over the lateral ligaments.  No tenderness over the DLES.  No evidence of instability.  Negative anterior drawer sign.  No lateral bony tenderness.  No proximal fibular tenderness.  No intra-articular swelling.  No extra-articular swelling, edema or tenderness.  No tenderness over the plantar aspect of the os calcis.  2+ DP and PT pulses.  Skin is intact.  No rashes, scars or lesions.\par   [de-identified] : Ambulating with a slightly antalgic to antalgic gait.  Station:  Normal.  [de-identified] : General Appearance:  Well-developed, well-nourished male in no acute distress. \par  [de-identified] : Outside radiographs show no obvious osseous abnormalities.

## 2021-01-06 ENCOUNTER — APPOINTMENT (OUTPATIENT)
Dept: ORTHOPEDIC SURGERY | Facility: CLINIC | Age: 46
End: 2021-01-06
Payer: OTHER MISCELLANEOUS

## 2021-01-06 VITALS
BODY MASS INDEX: 35.07 KG/M2 | DIASTOLIC BLOOD PRESSURE: 80 MMHG | WEIGHT: 245 LBS | SYSTOLIC BLOOD PRESSURE: 121 MMHG | HEIGHT: 70 IN | TEMPERATURE: 98 F | HEART RATE: 99 BPM

## 2021-01-06 PROCEDURE — 99212 OFFICE O/P EST SF 10 MIN: CPT

## 2021-01-06 PROCEDURE — 99072 ADDL SUPL MATRL&STAF TM PHE: CPT

## 2021-01-07 NOTE — HISTORY OF PRESENT ILLNESS
[de-identified] : Doron comes in today for his right ankle.  He states, overall, he is better, but he is still having some pain.

## 2021-01-07 NOTE — ADDENDUM
[FreeTextEntry1] : This note was written by Lucita Bravo on 01/07/2021 acting as scribe for Joseph Thompson III, MD

## 2021-01-07 NOTE — DISCUSSION/SUMMARY
[de-identified] : The patient presents with with a  medial and lateral ankle sprain, right, resolving.  At this time I recommend a low profile brace, start him in therapy and let him return to work in a week.  \par \par WE REQUEST AUTHORIZATION FOR PHYSICAL THERAPY AND A LOW PROFILE BRACE FOR THE RIGHT ANKLE.\par \par The Workers' Compensation guidelines have been followed.\par

## 2021-01-07 NOTE — PHYSICAL EXAM
[de-identified] : Right ankle:\par Ankle: Range of Motion in Degrees:\par 	                                 Claimant: 	Normal:	\par Dorsiflexion (Active)	40-degrees	40-degrees	\par Dorsiflexion (Passive)	40-degrees	40-degrees	\par Plantar (Active)	                40-degrees	40-degrees	\par Plantar (Passive)	                40-degrees	40-degrees	\par Inversion (Active)	                30-degrees	30-degrees	\par Inversion (Passive)	                30-degrees	30-degrees	\par Eversion  (Active)	                20-degrees	20-degrees	\par Eversion (Passive)  	20-degrees	20-degrees	\par \par No weakness in dorsiflexion, plantar flexion, inversion or eversion.  Normal sensation. Positive diffuse swelling laterally.  Tender over the lateral ligaments.  Diffuse swelling and tenderness over the medial ligament.  No tenderness over the DLES.  No evidence of instability.  Negative anterior drawer sign.  No lateral bony tenderness.  No proximal fibular tenderness.  No anterior, posterior, medial or lateral tendon tenderness.  No intra-articular swelling.  No extra-articular swelling, edema or tenderness.  No tenderness over the plantar aspect of the os calcis.  2+ DP and PT pulses.  Skin is intact.  No rashes, scars or lesions.  \par  \par  [de-identified] : Gait: Slightly antalgic to antalgic gait.  Station: Normal  [de-identified] : Appearance: Well-developed, well-nourished male  in no acute distress.

## 2021-01-07 NOTE — REASON FOR VISIT
[Follow-Up Visit] : a follow-up visit for [Worker's Compensation] : This visit is related to worker's compensation [FreeTextEntry2] : his right ankle.

## 2021-03-10 ENCOUNTER — APPOINTMENT (OUTPATIENT)
Dept: ORTHOPEDIC SURGERY | Facility: CLINIC | Age: 46
End: 2021-03-10
Payer: OTHER MISCELLANEOUS

## 2021-03-10 VITALS
BODY MASS INDEX: 35.07 KG/M2 | WEIGHT: 245 LBS | HEIGHT: 70 IN | SYSTOLIC BLOOD PRESSURE: 133 MMHG | HEART RATE: 93 BPM | DIASTOLIC BLOOD PRESSURE: 92 MMHG

## 2021-03-10 DIAGNOSIS — S93.491D SPRAIN OF OTHER LIGAMENT OF RIGHT ANKLE, SUBSEQUENT ENCOUNTER: ICD-10-CM

## 2021-03-10 PROCEDURE — 99072 ADDL SUPL MATRL&STAF TM PHE: CPT

## 2021-03-10 PROCEDURE — 99213 OFFICE O/P EST LOW 20 MIN: CPT

## 2021-03-15 NOTE — DISCUSSION/SUMMARY
[de-identified] : At this time, I am recommending that he return to therapy for the right ankle sprain.  He will be reassessed in four weeks.  Should his symptoms warrant, he may require an MRI at that point.  \par \par THIS IS A FORMAL REQUEST FOR AUTHORIZATION FOR PHYSICAL THERAPY FOR HIS RIGHT ANKLE.\par \par The Workers' Compensation guidelines have been followed.\par

## 2021-03-15 NOTE — ADDENDUM
[FreeTextEntry1] : This note was written by Miladis Richard on 03/12/2021 acting as scribe for Joseph Thompson III, MD

## 2021-03-15 NOTE — HISTORY OF PRESENT ILLNESS
[de-identified] : The patient comes in today for his right ankle.  He states he was starting to do better with therapy but then he had to stop and only had two visits because of work issues.

## 2021-03-15 NOTE — PHYSICAL EXAM
[de-identified] : Right Ankle: \par Range of Motion in Degrees:\par 	   Claimant: 	Normal:	\par Dorsiflexion (Active)	40-degrees	40-degrees	\par Dorsiflexion (Passive)	40-degrees	40-degrees	\par Plantar (Active)	 40-degrees	40-degrees	\par Plantar (Passive)	 40-degrees	40-degrees	\par Inversion (Active)	 30-degrees	30-degrees	\par Inversion (Passive)	 30-degrees	30-degrees	\par Eversion (Active)	 20-degrees	20-degrees	\par Eversion (Passive) 	20-degrees	20-degrees	\par \par No weakness in dorsiflexion, plantar flexion, inversion or eversion. Normal sensation. Positive diffuse swelling laterally. Tender over the lateral ligaments. Diffuse swelling and tenderness over the medial ligament. No tenderness over the DLES. No evidence of instability. Negative anterior drawer sign. No lateral bony tenderness. No proximal fibular tenderness. No anterior, posterior, medial or lateral tendon tenderness. No intra-articular swelling. No extra-articular swelling, edema or tenderness. No tenderness over the plantar aspect of the os calcis. 2+ DP and PT pulses. Skin is intact. No rashes, scars or lesions. \par  [de-identified] : Ambulating with a slightly antalgic to antalgic gait.  Station:  Normal.  [de-identified] : General Appearance:  Well-developed, well-nourished male in no acute distress.

## 2021-03-15 NOTE — REASON FOR VISIT
[Follow-Up Visit] : a follow-up visit for [FreeTextEntry2] : his right ankle.  This visit is related to a Workers' Compensation injury

## 2021-07-19 ENCOUNTER — NON-APPOINTMENT (OUTPATIENT)
Age: 46
End: 2021-07-19

## 2021-08-17 ENCOUNTER — APPOINTMENT (OUTPATIENT)
Dept: DISASTER EMERGENCY | Facility: CLINIC | Age: 46
End: 2021-08-17

## 2021-08-17 DIAGNOSIS — Z01.818 ENCOUNTER FOR OTHER PREPROCEDURAL EXAMINATION: ICD-10-CM

## 2021-08-18 LAB — SARS-COV-2 N GENE NPH QL NAA+PROBE: NOT DETECTED

## 2021-08-19 NOTE — ASU PATIENT PROFILE, ADULT - NSICDXPASTMEDICALHX_GEN_ALL_CORE_FT
PAST MEDICAL HISTORY:  Diverticulosis     GERD (gastroesophageal reflux disease)     Hiatal hernia     HLD (hyperlipidemia)     HTN (hypertension)     Peptic ulcer disease     Psoriasis

## 2021-08-20 ENCOUNTER — RESULT REVIEW (OUTPATIENT)
Age: 46
End: 2021-08-20

## 2021-08-20 ENCOUNTER — OUTPATIENT (OUTPATIENT)
Dept: OUTPATIENT SERVICES | Facility: HOSPITAL | Age: 46
LOS: 1 days | Discharge: HOME | End: 2021-08-20
Payer: COMMERCIAL

## 2021-08-20 ENCOUNTER — APPOINTMENT (OUTPATIENT)
Dept: GASTROENTEROLOGY | Facility: HOSPITAL | Age: 46
End: 2021-08-20

## 2021-08-20 VITALS
OXYGEN SATURATION: 96 % | HEART RATE: 76 BPM | RESPIRATION RATE: 20 BRPM | DIASTOLIC BLOOD PRESSURE: 87 MMHG | SYSTOLIC BLOOD PRESSURE: 120 MMHG

## 2021-08-20 VITALS — RESPIRATION RATE: 20 BRPM | OXYGEN SATURATION: 95 %

## 2021-08-20 DIAGNOSIS — K21.9 GASTRO-ESOPHAGEAL REFLUX DISEASE WITHOUT ESOPHAGITIS: ICD-10-CM

## 2021-08-20 DIAGNOSIS — Z98.89 OTHER SPECIFIED POSTPROCEDURAL STATES: Chronic | ICD-10-CM

## 2021-08-20 PROCEDURE — 43239 EGD BIOPSY SINGLE/MULTIPLE: CPT | Mod: GC

## 2021-08-20 PROCEDURE — 88312 SPECIAL STAINS GROUP 1: CPT | Mod: 26

## 2021-08-20 PROCEDURE — 88305 TISSUE EXAM BY PATHOLOGIST: CPT | Mod: 26

## 2021-08-25 LAB — SURGICAL PATHOLOGY STUDY: SIGNIFICANT CHANGE UP

## 2021-09-01 ENCOUNTER — NON-APPOINTMENT (OUTPATIENT)
Age: 46
End: 2021-09-01

## 2021-09-02 ENCOUNTER — NON-APPOINTMENT (OUTPATIENT)
Age: 46
End: 2021-09-02

## 2021-09-11 ENCOUNTER — APPOINTMENT (OUTPATIENT)
Dept: DISASTER EMERGENCY | Facility: CLINIC | Age: 46
End: 2021-09-11

## 2021-09-12 LAB — SARS-COV-2 N GENE NPH QL NAA+PROBE: NOT DETECTED

## 2021-09-13 ENCOUNTER — APPOINTMENT (OUTPATIENT)
Dept: GASTROENTEROLOGY | Facility: HOSPITAL | Age: 46
End: 2021-09-13

## 2021-09-13 ENCOUNTER — OUTPATIENT (OUTPATIENT)
Dept: OUTPATIENT SERVICES | Facility: HOSPITAL | Age: 46
LOS: 1 days | Discharge: HOME | End: 2021-09-13
Payer: COMMERCIAL

## 2021-09-13 VITALS
DIASTOLIC BLOOD PRESSURE: 95 MMHG | HEIGHT: 70 IN | HEART RATE: 79 BPM | TEMPERATURE: 98 F | OXYGEN SATURATION: 98 % | RESPIRATION RATE: 17 BRPM | SYSTOLIC BLOOD PRESSURE: 142 MMHG | WEIGHT: 250 LBS

## 2021-09-13 DIAGNOSIS — Z98.89 OTHER SPECIFIED POSTPROCEDURAL STATES: Chronic | ICD-10-CM

## 2021-09-13 PROCEDURE — 91037 ESOPH IMPED FUNCTION TEST: CPT | Mod: 26,GC

## 2021-09-13 PROCEDURE — 91010 ESOPHAGUS MOTILITY STUDY: CPT | Mod: 26,GC

## 2021-09-15 ENCOUNTER — APPOINTMENT (OUTPATIENT)
Dept: GASTROENTEROLOGY | Facility: CLINIC | Age: 46
End: 2021-09-15
Payer: COMMERCIAL

## 2021-09-15 DIAGNOSIS — R13.14 DYSPHAGIA, PHARYNGOESOPHAGEAL PHASE: ICD-10-CM

## 2021-09-15 PROCEDURE — 99213 OFFICE O/P EST LOW 20 MIN: CPT | Mod: 95

## 2021-09-15 PROCEDURE — 99203 OFFICE O/P NEW LOW 30 MIN: CPT | Mod: 95

## 2021-09-15 RX ORDER — VALSARTAN 80 MG/1
80 TABLET, COATED ORAL
Refills: 0 | Status: ACTIVE | COMMUNITY
Start: 2021-09-15

## 2021-09-15 RX ORDER — PANTOPRAZOLE 40 MG/1
40 TABLET, DELAYED RELEASE ORAL TWICE DAILY
Qty: 60 | Refills: 2 | Status: ACTIVE | COMMUNITY
Start: 2021-09-15 | End: 1900-01-01

## 2021-09-15 NOTE — ASSESSMENT
[FreeTextEntry1] : 46-year-old male found to have distal esophageal spasm on esophageal manometry, with complaints of chest pain/dysphagia.  Patient to be started on calcium channel blocker, nifedipine 30 mg daily.  I counseled him on side effects of hypotension, ankle swelling, headache, dizziness, flushing, fatigue.  He is to followup in one month.  \par #1.  Increase pantoprazole to twice a day, as RADHA is associated with GERD.\par #2.  Start nifedipine 30 mg XL q. daily, will titrate up as needed.\par #3.  Asked patient to undergo modified barium swallow, as well as barium esophagram to evaluate for cricopharyngeal bar or other etiologies given his oropharyngeal dysphagia\par #4.  Follow with me in one month

## 2021-09-15 NOTE — HISTORY OF PRESENT ILLNESS
[Other Location: e.g. School (Enter Location, City,State)___] : at [unfilled], at the time of the visit. [Medical Office: ([facility])___] : at the medical office located in  [Verbal consent obtained from patient] : the patient, [unfilled] [de-identified] : 46-year-old male, originally referred from Dr. Delroy Moura's office for complaints of significant chest pain, dysphagia.  \par \par Patient's complaints were sensation of choking when he is lying down at night.  He also has choking sensations when he is eating.  His wife states that he begins choking immediately after swallowing food.\par \par Patient was referred for bravo pH study while on PPI, which demonstrated adequate acid suppression.  Patient then underwent esophageal manometry, which demonstrated 50% of swallows with distal esophageal spasm and shortened distal latency.  Patient is here for discussion of test results and to be started on appropriate medication.\par Patient has history of hypertension, taking valsartan, he does not recall his dose.  His cardiologist is Dr. REYNOLDS.  He has an appointment to see him in the next month.

## 2021-09-27 ENCOUNTER — NON-APPOINTMENT (OUTPATIENT)
Age: 46
End: 2021-09-27

## 2021-10-22 NOTE — ED STATDOCS - SCRIBE NAME
Tiffany Buchanan

## 2022-07-22 ENCOUNTER — APPOINTMENT (OUTPATIENT)
Dept: RADIOLOGY | Facility: HOSPITAL | Age: 47
End: 2022-07-22

## 2022-07-22 ENCOUNTER — APPOINTMENT (OUTPATIENT)
Dept: SPEECH THERAPY | Facility: HOSPITAL | Age: 47
End: 2022-07-22

## 2022-07-22 ENCOUNTER — OUTPATIENT (OUTPATIENT)
Dept: OUTPATIENT SERVICES | Facility: HOSPITAL | Age: 47
LOS: 1 days | End: 2022-07-22

## 2022-07-22 DIAGNOSIS — R13.14 DYSPHAGIA, PHARYNGOESOPHAGEAL PHASE: ICD-10-CM

## 2022-07-22 DIAGNOSIS — Z98.89 OTHER SPECIFIED POSTPROCEDURAL STATES: Chronic | ICD-10-CM

## 2022-07-22 PROCEDURE — 74230 X-RAY XM SWLNG FUNCJ C+: CPT | Mod: 26

## 2022-08-05 ENCOUNTER — NON-APPOINTMENT (OUTPATIENT)
Age: 47
End: 2022-08-05

## 2023-02-14 ENCOUNTER — NON-APPOINTMENT (OUTPATIENT)
Age: 48
End: 2023-02-14

## 2023-02-14 ENCOUNTER — APPOINTMENT (OUTPATIENT)
Dept: COLORECTAL SURGERY | Facility: CLINIC | Age: 48
End: 2023-02-14
Payer: COMMERCIAL

## 2023-02-14 VITALS
WEIGHT: 260 LBS | HEART RATE: 103 BPM | SYSTOLIC BLOOD PRESSURE: 136 MMHG | BODY MASS INDEX: 37.22 KG/M2 | TEMPERATURE: 97.8 F | DIASTOLIC BLOOD PRESSURE: 88 MMHG | HEIGHT: 70 IN

## 2023-02-14 PROCEDURE — 46221 LIGATION OF HEMORRHOID(S): CPT

## 2023-02-14 PROCEDURE — 99204 OFFICE O/P NEW MOD 45 MIN: CPT | Mod: 25

## 2023-02-14 NOTE — PHYSICAL EXAM
[Abdomen Masses] : No abdominal masses [Abdomen Tenderness] : ~T No ~M abdominal tenderness [Tender] : nontender [Manually Reducible] : a manually reducible (grade III) [Tender, Swollen] : tender, swollen [Normal] : was normal [None] : there was no rectal mass  [JVD] : no jugular venous distention  [Normal Breath Sounds] : Normal breath sounds [Normal Heart Sounds] : normal heart sounds [Normal Rate and Rhythm] : normal rate and rhythm [No Rash or Lesion] : No rash or lesion [Alert] : alert [Oriented to Person] : oriented to person [Oriented to Place] : oriented to place [Oriented to Time] : oriented to time [Calm] : calm [de-identified] : Looks well in no distress, of stated age. [de-identified] : Moves all 4 extremities appropriately with 5 over 5 strength

## 2023-02-14 NOTE — ASSESSMENT
[FreeTextEntry1] : 48-year-old male with prolapsing and bleeding hemorrhoids.  Recommend rubber band ligation procedure, recommend high fiber diet, Metamucil daily, sitz baths, stool softeners, pain medications p.r.n.

## 2023-02-14 NOTE — HISTORY OF PRESENT ILLNESS
[FreeTextEntry1] : Initial visit for this 48-year-old male with complaints of prolapsing bleeding hemorrhoids patient sent to me by Dr. Thomason.  Symptoms have been worsening

## 2023-02-14 NOTE — REVIEW OF SYSTEMS
[As Noted in HPI] : as noted in HPI [Constipation] : constipation [Negative] : Heme/Lymph [FreeTextEntry7] : Prolapsing and bleeding hemorrhoids

## 2023-02-14 NOTE — CONSULT LETTER
[Dear  ___] : Dear  [unfilled], [Consult Letter:] : I had the pleasure of evaluating your patient, [unfilled]. [Please see my note below.] : Please see my note below. [Consult Closing:] : Thank you very much for allowing me to participate in the care of this patient.  If you have any questions, please do not hesitate to contact me. [Sincerely,] : Sincerely, [FreeTextEntry3] : Hari Goncalves M.D. FACS, FASCRS

## 2023-03-14 ENCOUNTER — APPOINTMENT (OUTPATIENT)
Dept: COLORECTAL SURGERY | Facility: CLINIC | Age: 48
End: 2023-03-14
Payer: COMMERCIAL

## 2023-03-14 VITALS
OXYGEN SATURATION: 98 % | BODY MASS INDEX: 37.22 KG/M2 | WEIGHT: 260 LBS | HEIGHT: 70 IN | SYSTOLIC BLOOD PRESSURE: 120 MMHG | HEART RATE: 109 BPM | DIASTOLIC BLOOD PRESSURE: 82 MMHG | TEMPERATURE: 98.7 F | RESPIRATION RATE: 16 BRPM

## 2023-03-14 PROCEDURE — 99214 OFFICE O/P EST MOD 30 MIN: CPT | Mod: 25

## 2023-03-14 PROCEDURE — 46221 LIGATION OF HEMORRHOID(S): CPT

## 2023-03-15 NOTE — PHYSICAL EXAM
[Abdomen Masses] : No abdominal masses [Abdomen Tenderness] : ~T No ~M abdominal tenderness [Tender] : nontender [Manually Reducible] : a manually reducible (grade III) [Tender, Swollen] : tender, swollen [Normal] : was normal [None] : there was no rectal mass  [JVD] : no jugular venous distention  [Normal Breath Sounds] : Normal breath sounds [Normal Heart Sounds] : normal heart sounds [Normal Rate and Rhythm] : normal rate and rhythm [No Rash or Lesion] : No rash or lesion [Alert] : alert [Oriented to Person] : oriented to person [Oriented to Place] : oriented to place [Oriented to Time] : oriented to time [Calm] : calm [de-identified] : Looks well in no distress, of stated age. [de-identified] : Moves all 4 extremities appropriately with 5 over 5 strength

## 2023-03-15 NOTE — HISTORY OF PRESENT ILLNESS
[FreeTextEntry1] :  48-year-old male with complaints of prolapsing bleeding hemorrhoids patient sent to me by Dr. Thomason.  Symptoms have been worsening. underwent rubber band ligation with good results

## 2023-04-11 ENCOUNTER — APPOINTMENT (OUTPATIENT)
Dept: COLORECTAL SURGERY | Facility: CLINIC | Age: 48
End: 2023-04-11
Payer: COMMERCIAL

## 2023-04-11 VITALS
SYSTOLIC BLOOD PRESSURE: 141 MMHG | DIASTOLIC BLOOD PRESSURE: 88 MMHG | BODY MASS INDEX: 37.94 KG/M2 | WEIGHT: 265 LBS | TEMPERATURE: 97 F | HEIGHT: 70 IN

## 2023-04-11 PROCEDURE — 99214 OFFICE O/P EST MOD 30 MIN: CPT | Mod: 25

## 2023-04-11 PROCEDURE — 46221 LIGATION OF HEMORRHOID(S): CPT

## 2023-04-22 NOTE — PHYSICAL EXAM
[Abdomen Masses] : No abdominal masses [Abdomen Tenderness] : ~T No ~M abdominal tenderness [Tender] : nontender [Tender, Swollen] : tender, swollen [Manually Reducible] : a manually reducible (grade III) [Normal] : was normal [JVD] : no jugular venous distention  [None] : there was no rectal mass  [Normal Breath Sounds] : Normal breath sounds [Normal Heart Sounds] : normal heart sounds [No Rash or Lesion] : No rash or lesion [Normal Rate and Rhythm] : normal rate and rhythm [Alert] : alert [Oriented to Person] : oriented to person [Oriented to Place] : oriented to place [Oriented to Time] : oriented to time [Calm] : calm [de-identified] : Looks well in no distress, of stated age. [de-identified] : Moves all 4 extremities appropriately with 5 over 5 strength

## 2023-04-22 NOTE — CONSULT LETTER
[Please see my note below.] : Please see my note below. [Consult Closing:] : Thank you very much for allowing me to participate in the care of this patient.  If you have any questions, please do not hesitate to contact me. [Sincerely,] : Sincerely, [FreeTextEntry3] : Hari Goncalves M.D. FACS, FASCRS

## 2023-04-22 NOTE — HISTORY OF PRESENT ILLNESS
[FreeTextEntry1] :  48-year-old male with complaints of prolapsing bleeding hemorrhoids patient sent to me by Dr. Thomason.  Symptoms have been worsening. underwent rubber band ligation with good results, improving

## 2023-05-09 ENCOUNTER — APPOINTMENT (OUTPATIENT)
Dept: COLORECTAL SURGERY | Facility: CLINIC | Age: 48
End: 2023-05-09
Payer: COMMERCIAL

## 2023-05-09 VITALS
SYSTOLIC BLOOD PRESSURE: 130 MMHG | RESPIRATION RATE: 15 BRPM | HEIGHT: 70 IN | DIASTOLIC BLOOD PRESSURE: 86 MMHG | WEIGHT: 265 LBS | BODY MASS INDEX: 37.94 KG/M2 | HEART RATE: 96 BPM | TEMPERATURE: 97 F

## 2023-05-09 DIAGNOSIS — K64.4 RESIDUAL HEMORRHOIDAL SKIN TAGS: ICD-10-CM

## 2023-05-09 DIAGNOSIS — K64.8 OTHER HEMORRHOIDS: ICD-10-CM

## 2023-05-09 DIAGNOSIS — K64.8 RESIDUAL HEMORRHOIDAL SKIN TAGS: ICD-10-CM

## 2023-05-09 PROCEDURE — 99214 OFFICE O/P EST MOD 30 MIN: CPT | Mod: 25

## 2023-05-09 PROCEDURE — 46221 LIGATION OF HEMORRHOID(S): CPT

## 2023-05-15 PROBLEM — K64.8 INTERNAL AND EXTERNAL PROLAPSED HEMORRHOIDS: Status: ACTIVE | Noted: 2023-03-15

## 2023-05-15 PROBLEM — K64.4 INTERNAL AND EXTERNAL BLEEDING HEMORRHOIDS: Status: ACTIVE | Noted: 2023-03-15

## 2023-05-15 NOTE — ASSESSMENT
[FreeTextEntry1] : 48-year-old male with prolapsing and bleeding hemorrhoids.  improving. Recommend rubber band ligation procedure, recommend high fiber diet, Metamucil daily, sitz baths, stool softeners, pain medications p.r.n.

## 2023-05-15 NOTE — PHYSICAL EXAM
[Abdomen Masses] : No abdominal masses [Abdomen Tenderness] : ~T No ~M abdominal tenderness [Tender] : nontender [Manually Reducible] : a manually reducible (grade III) [Tender, Swollen] : tender, swollen [Normal] : was normal [None] : there was no rectal mass  [JVD] : no jugular venous distention  [Normal Breath Sounds] : Normal breath sounds [Normal Heart Sounds] : normal heart sounds [Normal Rate and Rhythm] : normal rate and rhythm [No Rash or Lesion] : No rash or lesion [Alert] : alert [Oriented to Person] : oriented to person [Oriented to Place] : oriented to place [Oriented to Time] : oriented to time [Calm] : calm [de-identified] : Looks well in no distress, of stated age. [de-identified] : Moves all 4 extremities appropriately with 5 over 5 strength

## 2023-08-14 ENCOUNTER — APPOINTMENT (OUTPATIENT)
Dept: GASTROENTEROLOGY | Facility: CLINIC | Age: 48
End: 2023-08-14

## 2023-09-05 NOTE — ED ADULT TRIAGE NOTE - AS HEIGHT TYPE
Problem: At Risk for Falls  Intervention: Risk for Fall Interventions (specify)  Description: Selectively initiate interventions based on patient-specific fall risks. Document in  Associated Rows on Daily Cares.  Flowsheets  Taken 9/5/2023 0350 by Lyly Celeste CNA  Environmental Safety Measures Maintained: Done  Patient Specific Safety Measures in Use: Encourage personal sensory support item use  Taken 9/5/2023 0200 by Tru Bourgeois RN  Patient's Personal Risk Factors:   Problems with walking or moving   Potential for overestimating ability  Mobility and Gait Measures:   Collaborate with PT   Encourage personal mobility support item use   Mobilize (Early and progressive ambulation unless contraindicated)   Use gait belt  Elimination Risk Interventions: (hernandez)   Utilize visual cues (e.g. yield sign)   Other (comment)      stated

## 2023-10-05 ENCOUNTER — APPOINTMENT (OUTPATIENT)
Dept: GASTROENTEROLOGY | Facility: CLINIC | Age: 48
End: 2023-10-05
Payer: COMMERCIAL

## 2023-10-05 VITALS
SYSTOLIC BLOOD PRESSURE: 124 MMHG | HEIGHT: 70 IN | DIASTOLIC BLOOD PRESSURE: 78 MMHG | WEIGHT: 260 LBS | OXYGEN SATURATION: 100 % | HEART RATE: 99 BPM | BODY MASS INDEX: 37.22 KG/M2

## 2023-10-05 DIAGNOSIS — K43.6 OTHER AND UNSPECIFIED VENTRAL HERNIA WITH OBSTRUCTION, W/OUT GANGRENE: ICD-10-CM

## 2023-10-05 DIAGNOSIS — K21.9 GASTRO-ESOPHAGEAL REFLUX DISEASE W/OUT ESOPHAGITIS: ICD-10-CM

## 2023-10-05 DIAGNOSIS — K43.9 VENTRAL HERNIA W/OUT OBSTRUCTION OR GANGRENE: ICD-10-CM

## 2023-10-05 DIAGNOSIS — K22.4 DYSKINESIA OF ESOPHAGUS: ICD-10-CM

## 2023-10-05 PROCEDURE — 99213 OFFICE O/P EST LOW 20 MIN: CPT

## 2023-10-05 RX ORDER — NIFEDIPINE 30 MG/1
30 TABLET, EXTENDED RELEASE ORAL DAILY
Qty: 90 | Refills: 3 | Status: ACTIVE | COMMUNITY
Start: 2021-09-15 | End: 1900-01-01

## 2023-10-05 RX ORDER — MONTELUKAST 10 MG/1
10 TABLET, FILM COATED ORAL
Qty: 30 | Refills: 0 | Status: DISCONTINUED | COMMUNITY
Start: 2017-05-17 | End: 2023-10-05

## 2023-10-05 RX ORDER — PREDNISONE 20 MG/1
20 TABLET ORAL TWICE DAILY
Qty: 14 | Refills: 1 | Status: COMPLETED | COMMUNITY
Start: 2018-02-26 | End: 2023-10-05

## 2023-10-05 RX ORDER — ALPRAZOLAM 0.5 MG/1
0.5 TABLET ORAL
Qty: 90 | Refills: 0 | Status: COMPLETED | COMMUNITY
Start: 2017-02-13 | End: 2023-10-05

## 2023-11-01 NOTE — ED PROVIDER NOTE - PMH
Diverticulosis    GERD (gastroesophageal reflux disease)    Hiatal hernia    HLD (hyperlipidemia)    HTN (hypertension)    Peptic ulcer disease    Psoriasis yes

## 2024-01-08 ENCOUNTER — APPOINTMENT (OUTPATIENT)
Dept: GASTROENTEROLOGY | Facility: CLINIC | Age: 49
End: 2024-01-08

## 2025-03-31 NOTE — ED ADULT NURSE NOTE - PAIN RATING/NUMBER SCALE (0-10): REST
March 31, 2025      [facility]  [address]  [place] [address]  Phone: [phone]  Fax: [phone]       Patient: Easton Kenny   YOB: 1996  Date of Visit: 03/31/2025    To Whom It May Concern:    Rosie Kenny  was at [facility] on 03/31/2025. The patient may return to work/school on 4/1/25 with no restrictions. If you have any questions or concerns, or if I can be of further assistance, please do not hesitate to contact me.    Sincerely,    PINO Ulloa     
0

## 2025-07-04 ENCOUNTER — NON-APPOINTMENT (OUTPATIENT)
Age: 50
End: 2025-07-04